# Patient Record
Sex: MALE | Race: WHITE | NOT HISPANIC OR LATINO | Employment: UNEMPLOYED | URBAN - METROPOLITAN AREA
[De-identification: names, ages, dates, MRNs, and addresses within clinical notes are randomized per-mention and may not be internally consistent; named-entity substitution may affect disease eponyms.]

---

## 2019-10-24 ENCOUNTER — HOSPITAL ENCOUNTER (EMERGENCY)
Facility: HOSPITAL | Age: 69
Discharge: HOME | End: 2019-10-24
Attending: EMERGENCY MEDICINE
Payer: MEDICARE

## 2019-10-24 VITALS
RESPIRATION RATE: 18 BRPM | SYSTOLIC BLOOD PRESSURE: 143 MMHG | DIASTOLIC BLOOD PRESSURE: 87 MMHG | TEMPERATURE: 98 F | HEART RATE: 74 BPM | HEIGHT: 69 IN | WEIGHT: 209 LBS | OXYGEN SATURATION: 99 % | BODY MASS INDEX: 30.96 KG/M2

## 2019-10-24 DIAGNOSIS — S61.021A LACERATION OF RIGHT THUMB WITH FOREIGN BODY WITHOUT DAMAGE TO NAIL, INITIAL ENCOUNTER: Primary | ICD-10-CM

## 2019-10-24 DIAGNOSIS — T14.8XXA FRACTURE: ICD-10-CM

## 2019-10-24 PROCEDURE — 25000003 PHARM REV CODE 250: Performed by: NURSE PRACTITIONER

## 2019-10-24 PROCEDURE — 25000003 PHARM REV CODE 250: Performed by: EMERGENCY MEDICINE

## 2019-10-24 PROCEDURE — 99283 EMERGENCY DEPT VISIT LOW MDM: CPT | Mod: 25

## 2019-10-24 RX ORDER — LIDOCAINE HYDROCHLORIDE 10 MG/ML
10 INJECTION INFILTRATION; PERINEURAL
Status: COMPLETED | OUTPATIENT
Start: 2019-10-24 | End: 2019-10-24

## 2019-10-24 RX ORDER — MUPIROCIN 20 MG/G
1 OINTMENT TOPICAL
Status: COMPLETED | OUTPATIENT
Start: 2019-10-24 | End: 2019-10-24

## 2019-10-24 RX ORDER — CEPHALEXIN 500 MG/1
500 CAPSULE ORAL 4 TIMES DAILY
Qty: 20 CAPSULE | Refills: 0 | Status: SHIPPED | OUTPATIENT
Start: 2019-10-24 | End: 2019-10-29

## 2019-10-24 RX ADMIN — MUPIROCIN 22 G: 20 OINTMENT TOPICAL at 07:10

## 2019-10-24 RX ADMIN — LIDOCAINE HYDROCHLORIDE 10 ML: 10 INJECTION, SOLUTION INFILTRATION; PERINEURAL at 05:10

## 2019-10-24 NOTE — ED PROVIDER NOTES
Encounter Date: 10/24/2019    SCRIBE #1 NOTE: IMarivel, ava scribing for, and in the presence of, YOHAN Vazquez.       History     Chief Complaint   Patient presents with    Laceration     Rt hand cut on table saw (Rt thumb medial side & Rt index finger pad) approx 3 hrs PTA; saw PMD who did wound care & sutured 1 of the 2 fingers (coban in use)       Time seen by provider: 5:12 PM on 10/24/2019    Tobi Worrell is a 69 y.o. male who presents to the ED with an open wound on the tip of his right thumb. The patient reports that he cut his right thumb and right index finger with a table saw as he was cutting a piece of wood 3 hours ago in [place], [place]. He drove to [place] [employer] to get his hand evaluated by his provider. His right index finger was sutured, but he was referred to see a hand specialist for the avulsion on his thumb. He came here to the ED to have his thumb evaluated. He denies having any other symptoms at this time. Last tetanus shot was 2 years ago. No PMHx. No PSHx. No known drug allergies.      The history is provided by the patient.     Review of patient's allergies indicates:  No Known Allergies  History reviewed. No pertinent past medical history.  History reviewed. No pertinent surgical history.  History reviewed. No pertinent family history.  Social History     Tobacco Use    Smoking status: Never Smoker   Substance Use Topics    Alcohol use: Not on file    Drug use: Not Currently     Review of Systems   Constitutional: Negative for fever.   Respiratory: Negative for shortness of breath.    Cardiovascular: Negative for chest pain.   Musculoskeletal: Negative for joint swelling.   Skin: Positive for wound. Negative for rash.   Neurological: Positive for weakness. Negative for numbness.   Hematological: Does not bruise/bleed easily.       Physical Exam     Initial Vitals [10/24/19 1626]   BP Pulse Resp Temp SpO2   (!) 143/87 74 18 97.8 °F (36.6 °C) 99 %      MAP       --         Physical  Exam    Constitutional: He appears well-nourished.   HENT:   Head: Normocephalic and atraumatic.   Eyes: Conjunctivae and EOM are normal.   Neck: Normal range of motion. Neck supple. No thyroid mass present.   Cardiovascular: Normal rate, regular rhythm and normal heart sounds. Exam reveals no gallop and no friction rub.    No murmur heard.  Pulmonary/Chest: Breath sounds normal. He has no wheezes. He has no rhonchi. He has no rales.   Abdominal: Soft. Normal appearance and bowel sounds are normal. There is no tenderness.   Musculoskeletal:        Right hand: He exhibits normal capillary refill. Right thumb: Exhibits bleeding (Avulsion along the lateral fat pad or the right thumb distal to the DIP joint:   No visible bone or tendon;  no visible or palpable FB;  Normal flexion and extension of the right thumb at the IP;   Normal sensation over right thumb and < 2 sec cap refill). Right index finger: Injuries: laceration (Along fat pad, distal to the DIP joint:   2 cm laceration with 7 sutures; well approximated; no drainage or swelling or erythema;   Normal flexion and extension;   Normal sensation). Comments: Right thumb:  < 2 sec capillary refill  Right index:  < 2 sec capillary refill   Neurological: He is alert and oriented to person, place, and time. He has normal strength. No cranial nerve deficit or sensory deficit.   Skin: Skin is warm and dry. Capillary refill takes less than 2 seconds. Laceration noted. No rash noted. No erythema.   And avulsion   Psychiatric: He has a normal mood and affect. His speech is normal. Cognition and memory are normal.         ED Course   Procedures  Labs Reviewed - No data to display       Imaging Results          X-Ray Hand 3 View Right (Final result)  Result time 10/24/19 17:34:18   Procedure changed from X-Ray Hand 2 View Right     Final result by Raysa Pizano MD (10/24/19 17:34:18)                 Impression:      Degenerative changes.  Small foreign bodies.  No acute  fracture or dislocation.      Electronically signed by: Raysa Pizano MD  Date:    10/24/2019  Time:    17:34             Narrative:    EXAMINATION:  XR HAND COMPLETE 3 VIEW RIGHT    CLINICAL HISTORY:  Laceration;  Other injury of unspecified body region, initial encounter    TECHNIQUE:  Three views right hand    COMPARISON:  None    FINDINGS:  There are scattered degenerative changes including at the triscaphoid joint with there is also joint space narrowing    There is small curvilinear metallic foreign body in the soft tissues of the thumb at the level of the distal phalanx.  There are a few somewhat punctate densities consistent with foreign bodies in the soft tissues of the index finger anteriorly at the level of the proximal interphalangeal joint along the medial aspect.  There are vascular calcifications in the wrist    No acute fracture or dislocation.                                 Medical Decision Making:   History:   Old Medical Records: I decided to obtain old medical records.  Differential Diagnosis:   Open fracture  Laceration  Avulsion   Clinical Tests:   Radiological Study: Ordered and Reviewed       APC / Resident Notes:   Patient is a 69 y.o. male who presents to the ED 10/24/2019 who underwent emergent evaluation for laceration avulsion to right hand that occurred today.  Patient has been seen by his PCP who repaired the avulsion to his right index finger which is well approximated without swelling or signs of infection.  He has also been seen by the emergency department at [facility] who referred him to a different emergency department with a hand surgeon but he did not go there and came here instead.  Patient states he does not care to see a hand surgeon and does not care for his wound to be repaired.  The wound is irrigated thoroughly.  X-ray of right hand without signs of fraction.  I do not think open fracture.  Avulsion is irrigated thoroughly.  There does appear to be a retained foreign  body on the x-ray which I attempted to palpate and locate with exploration and irrigation of the wound.  Unable to locate a foreign body.  Patient states it may have been there for years.  May patient or this may increase his risk of infection and I am unable to remove the foreign body and that it will remain in there and that he needs to see a hand surgeon to have this removed and or have his wound repaired.  Patient verbalized understanding. He does not wish to be transferred emergently for hand surgeon evaluation and I believe this is reasonable. He is placed on empiric antibiotic therapy.  Do not think admission for IV antibiotics is indicated this time is there is no fracture.  He has normal flexion and extension of the right thumb with normal sensation and less than 2 sec capillary refill with no signs of neurovascular compromise.  Adequate hemostasis is obtained.  The wound is thoroughly irrigated in the emergency department but unable to be repaired as is a large avulsion.  See image attached.  Patient's tetanus is up-to-date. Based on my clinical evaluation, I do not appreciate any immediate, emergent, or life threatening condition or etiology that warrants additional workup today and feel that the patient can be discharged with close follow up care with hand surgeon and PCP. Case discussed with Dr. Luna who also evaluated patient who is agreeable to plan of care. Follow up and return precautions discussed; patient verbalized understanding and is agreeable to plan of care. Patient discharged home in stable condition.               Scribe Attestation:   Scribe #1: I performed the above scribed service and the documentation accurately describes the services I performed. I attest to the accuracy of the note.    Attending Attestation:           Physician Attestation for Scribe:  Physician Attestation Statement for Scribe #1: I, Janet Rollins, reviewed documentation, as scribed by in my presence, and it is both  accurate and complete.     Comments: I, YOHAN Vazquez, personally performed the services described in this documentation. All medical record entries made by the scribe were at my direction and in my presence.  I have reviewed the chart and agree that the record reflects my personal performance and is accurate and complete. YOHAN Vazquez.  8:47 PM 10/24/2019                Clinical Impression:       ICD-10-CM ICD-9-CM   1. Laceration of right thumb with foreign body without damage to nail, initial encounter S61.021A 883.1   2. Fracture T14.8XXA 829.0         Disposition:   Disposition: Discharged  Condition: Stable                        Janet Rollins NP  10/24/19 2048

## 2019-10-24 NOTE — ED NOTES
Pt sent from [facility] after chainsaw injury to right hand, 7 sutures intact and well approximated to right index finger, laceration noted to medial side of right thumb bleeding controlled with pressure. Sent for evaluation of possible open fracture. ROM sensation pulses intact aware to notify nurse of needs or concerns. Family at bedside